# Patient Record
Sex: FEMALE
[De-identification: names, ages, dates, MRNs, and addresses within clinical notes are randomized per-mention and may not be internally consistent; named-entity substitution may affect disease eponyms.]

---

## 2024-01-28 ENCOUNTER — NON-APPOINTMENT (OUTPATIENT)
Age: 24
End: 2024-01-28

## 2024-02-02 ENCOUNTER — NON-APPOINTMENT (OUTPATIENT)
Age: 24
End: 2024-02-02

## 2024-02-05 ENCOUNTER — APPOINTMENT (OUTPATIENT)
Dept: ORTHOPEDIC SURGERY | Facility: CLINIC | Age: 24
End: 2024-02-05
Payer: COMMERCIAL

## 2024-02-05 VITALS — HEIGHT: 64 IN | WEIGHT: 132 LBS | BODY MASS INDEX: 22.53 KG/M2

## 2024-02-05 PROBLEM — Z00.00 ENCOUNTER FOR PREVENTIVE HEALTH EXAMINATION: Status: ACTIVE | Noted: 2024-02-05

## 2024-02-05 PROCEDURE — 73030 X-RAY EXAM OF SHOULDER: CPT | Mod: LT

## 2024-02-05 PROCEDURE — 73070 X-RAY EXAM OF ELBOW: CPT | Mod: LT

## 2024-02-05 PROCEDURE — 99203 OFFICE O/P NEW LOW 30 MIN: CPT

## 2024-02-06 NOTE — HISTORY OF PRESENT ILLNESS
[de-identified] : Initial Visit:  left shoulder Reason: injured while snow boarding Duration: 1 week Prior studies: x rays ordered Symptoms: Aggravating Fx:  Alleviating Fx: in sling Pain:0/10 Pain med: OTC Tylenol Medical Hx: none Surgery Hx: N/A Current MEd: Allergies:NKA

## 2024-02-06 NOTE — PHYSICAL EXAM
[de-identified] : Left Shoulder: Constitutional: The patient is healthy-appearing and in no apparent distress.   Cardiovascular System:  The capillary refill is less than 2 seconds.   Skin:  There are no skin abnormalities.  C-Spine/Neck:  Active Range of Motion: Flexion				50 Extension			60 Lateral rotation			80    Left Shoulder:  Inspection:  There is no atrophy, erythema, warmth, swelling. There is no scapular winging. There is no AC prominence.   Bony Palpation:  There is no tenderness of the clavicle. There is no tenderness of the acromioclavicular joint. There is no tenderness of the greater tuberosity.  There is no tenderness of the bicipital groove.   Soft Tissue Palpation:  There is no tenderness of the trapezius. There is no tenderness of the rhomboid. There is no tenderness of the subacromial bursa.   Active Range of Motion:  Forward flexion- 				170  Abduction-					130 External rotation at 0 degrees abduction-	80  Internal rotation at 0 degrees abduction-	80  Passive Range of Motion:  Forward flexion- 			180  Abduction-				130 External rotation at 0 deg abduction-	80  Internal rotation at 0 deg abduction-	80  Strength: Supraspinatus / Abduction                  5/5 External rotation                                 5/5 Internal roation                                   5/5  Special Tests:  Hawkin's  				Positive  Neer's  				Positive  Speed's  				Negative AC cross-over 			            Negative [place]'s  				Negative  Neurological System:   There is normal sensation to light touch C5-T1.   Stability:  There is no general laxity.   Psychiatric:  The patient demonstrates a normal mood and affect and is active and alert [de-identified] : X-ray left shoulder: There is no significant bony / soft tissue abnormality, arthritis, or fracture.

## 2024-02-26 ENCOUNTER — APPOINTMENT (OUTPATIENT)
Dept: ORTHOPEDIC SURGERY | Facility: CLINIC | Age: 24
End: 2024-02-26
Payer: COMMERCIAL

## 2024-02-26 VITALS — HEIGHT: 64 IN | WEIGHT: 132 LBS | BODY MASS INDEX: 22.53 KG/M2

## 2024-02-26 DIAGNOSIS — M75.52 BURSITIS OF LEFT SHOULDER: ICD-10-CM

## 2024-02-26 DIAGNOSIS — S86.811A STRAIN OF OTHER MUSCLE(S) AND TENDON(S) AT LOWER LEG LEVEL, RIGHT LEG, INITIAL ENCOUNTER: ICD-10-CM

## 2024-02-26 PROCEDURE — 99213 OFFICE O/P EST LOW 20 MIN: CPT

## 2024-02-26 PROCEDURE — 73590 X-RAY EXAM OF LOWER LEG: CPT | Mod: LT

## 2024-02-26 NOTE — HISTORY OF PRESENT ILLNESS
[de-identified] : New issue: Right calf pain Pain Level:4/10  Symptoms: limited range of motion/ cracking/ feels strained  / Right calf soreness and aching upon waking in the morning

## 2024-02-26 NOTE — ASSESSMENT
[FreeTextEntry1] : Discussed at length with patient treatment options and at this time patient elects continued observation and home exercises for the shoulder ultimately if persistent symptoms consideration to MRI she was offered a cortisone injection but declined at this time patient elects home exercise and observation for the calf

## 2024-02-26 NOTE — PHYSICAL EXAM
[de-identified] : Left Shoulder: Constitutional: The patient is healthy-appearing and in no apparent distress.   Cardiovascular System:  The capillary refill is less than 2 seconds.   Skin:  There are no skin abnormalities.  C-Spine/Neck:  Active Range of Motion: Flexion				50 Extension			60 Lateral rotation			80    Left Shoulder:  Inspection:  There is no atrophy, erythema, warmth, swelling. There is no scapular winging. There is no AC prominence.   Bony Palpation:  There is no tenderness of the clavicle. There is no tenderness of the acromioclavicular joint. There is no tenderness of the greater tuberosity.  There is no tenderness of the bicipital groove.   Soft Tissue Palpation:  There is no tenderness of the trapezius. There is no tenderness of the rhomboid. There is no tenderness of the subacromial bursa.   Active Range of Motion:  Forward flexion- 				175 Abduction-					150 External rotation at 0 degrees abduction-	80  Internal rotation at 0 degrees abduction-	80  Passive Range of Motion:  Forward flexion- 			180  Abduction-				130 External rotation at 0 deg abduction-	80  Internal rotation at 0 deg abduction-	80  Strength: Supraspinatus / Abduction                  5/5 External rotation                                 5/5 Internal roation                                   5/5  Special Tests:  Hawkin's  				Positive  Neer's  				Positive  Speed's  				Negative AC cross-over 			            Negative [place]'s  				Negative  Neurological System:   There is normal sensation to light touch C5-T1.   Stability:  There is no general laxity.   Psychiatric:  The patient demonstrates a normal mood and affect and is active and alert  On exam of the right calf there is no tenderness to palpation throughout the leg there is full range of motion about the knee and ankle there is a negative Elizabeth there is no tenderness over the tibial and or tibia and fibula throughout [de-identified] : X-ray right tib-fib: There is no significant bony / soft tissue abnormality, arthritis, or fracture.

## 2024-04-06 ENCOUNTER — NON-APPOINTMENT (OUTPATIENT)
Age: 24
End: 2024-04-06